# Patient Record
Sex: MALE | Race: WHITE | Employment: FULL TIME | URBAN - METROPOLITAN AREA
[De-identification: names, ages, dates, MRNs, and addresses within clinical notes are randomized per-mention and may not be internally consistent; named-entity substitution may affect disease eponyms.]

---

## 2022-02-10 ENCOUNTER — HOSPITAL ENCOUNTER (OUTPATIENT)
Dept: OCCUPATIONAL MEDICINE | Age: 32
Discharge: HOME | End: 2022-02-10

## 2022-02-10 ENCOUNTER — TRANSCRIBE ORDER (OUTPATIENT)
Dept: OCCUPATIONAL MEDICINE | Age: 32
End: 2022-02-10

## 2022-02-10 DIAGNOSIS — T14.90XA INJURY: Primary | ICD-10-CM

## 2022-02-10 DIAGNOSIS — T14.90XA INJURY: ICD-10-CM

## 2024-05-20 ENCOUNTER — OFFICE VISIT (OUTPATIENT)
Dept: UROLOGY | Age: 34
End: 2024-05-20
Payer: OTHER GOVERNMENT

## 2024-05-20 DIAGNOSIS — E34.9 ABNORMALITY OF HORMONE: ICD-10-CM

## 2024-05-20 DIAGNOSIS — N52.9 ERECTILE DYSFUNCTION, UNSPECIFIED ERECTILE DYSFUNCTION TYPE: Primary | ICD-10-CM

## 2024-05-20 DIAGNOSIS — R35.0 URINARY FREQUENCY: ICD-10-CM

## 2024-05-20 DIAGNOSIS — R39.15 URINARY URGENCY: ICD-10-CM

## 2024-05-20 LAB
BILIRUBIN, URINE, POC: NEGATIVE
BLOOD URINE, POC: NEGATIVE
ESTRADIOL SERPL-MCNC: 16.5 PG/ML
FSH SERPL-ACNC: 2.1 MIU/ML
GLUCOSE URINE, POC: NEGATIVE
KETONES, URINE, POC: NEGATIVE
LEUKOCYTE ESTERASE, URINE, POC: NEGATIVE
LH SERPL-ACNC: 7.8 MIU/ML
NITRITE, URINE, POC: NEGATIVE
PH, URINE, POC: 8 (ref 4.6–8)
PROLACTIN SERPL-MCNC: 10.2 NG/ML (ref 4–15.2)
PROTEIN,URINE, POC: NEGATIVE
SPECIFIC GRAVITY, URINE, POC: 1.01 (ref 1–1.03)
URINALYSIS CLARITY, POC: NORMAL
URINALYSIS COLOR, POC: NORMAL
UROBILINOGEN, POC: NORMAL

## 2024-05-20 PROCEDURE — 99204 OFFICE O/P NEW MOD 45 MIN: CPT | Performed by: NURSE PRACTITIONER

## 2024-05-20 PROCEDURE — 81003 URINALYSIS AUTO W/O SCOPE: CPT | Performed by: NURSE PRACTITIONER

## 2024-05-20 RX ORDER — TADALAFIL 5 MG/1
5 TABLET ORAL DAILY
Qty: 90 TABLET | Refills: 3 | Status: SHIPPED | OUTPATIENT
Start: 2024-05-20

## 2024-05-20 RX ORDER — PROPRANOLOL HYDROCHLORIDE 20 MG/1
20 TABLET ORAL
COMMUNITY
Start: 2024-01-25

## 2024-05-20 RX ORDER — PANTOPRAZOLE SODIUM 40 MG/1
40 TABLET, DELAYED RELEASE ORAL
COMMUNITY
Start: 2024-01-25

## 2024-05-20 RX ORDER — SOLIFENACIN SUCCINATE 5 MG/1
5 TABLET, FILM COATED ORAL DAILY
Qty: 90 TABLET | Refills: 3 | Status: SHIPPED | OUTPATIENT
Start: 2024-05-20

## 2024-05-20 ASSESSMENT — ENCOUNTER SYMPTOMS
WHEEZING: 0
BLOOD IN STOOL: 0
ABDOMINAL PAIN: 0
COUGH: 0
EYE DISCHARGE: 0
NAUSEA: 0
EYE PAIN: 0
SHORTNESS OF BREATH: 0
BACK PAIN: 0
HEARTBURN: 0
SKIN LESIONS: 0
CONSTIPATION: 0
DIARRHEA: 0
VOMITING: 0
INDIGESTION: 0

## 2024-05-20 NOTE — PROGRESS NOTES
[facility]  [address] [address]  [phone]          Pastora Dougherty  : 1990    Chief Complaint   Patient presents with    New Patient    Erectile Dysfunction          HPI     Pastora Dougherty is a 34 y.o. male referred for ED. He reports issue w ED and voiding issues started in . He was serving over [employer]. Was taking Mefloquine for malaria prevention. While on this he had 2 episodes of nocturnal enuresis. This resolved. Cont to have sign urinary frequency and urgency. His son has one kidney. [facility] of kidneys normal per pt report. Has NO urinary hesitancy or weak stream. No hematuria.     He does reports having abnormal free testosterone and estrogen levels. I do not have access to this. He wants to see endocrinology. [facility] pt but he needs new labs.    Labs:  Total T: () 622, (3/24) 573.74    No personal or family h/o urological CA. Smokeless tobacco user.     . Previous vasectomy.       History reviewed. No pertinent past medical history.  Past Surgical History:   Procedure Laterality Date    ORTHOPEDIC SURGERY      right wrist/ right foot     Current Outpatient Medications   Medication Sig Dispense Refill    pantoprazole (PROTONIX) 40 MG tablet 1 tablet      Phentermine-Topiramate 7.5-46 MG CP24 TAKE 1 CAPSULE BY MOUTH EVERY DAY FOR WEIGHT LOSS FOR 12 WEEKS. **START TAKING AFTER FINISHING PHENTERMINE 3.75/TOPIRAMATE  23 MG CAPSULES** FOR 12 WEEKS. **START TAKING AFTER FINISHING PHENTERMINE 3.75/TOPIRAMATE  23 MG CAPSULES**      propranolol (INDERAL) 20 MG tablet 1 tablet      solifenacin (VESICARE) 5 MG tablet Take 1 tablet by mouth daily 90 tablet 3    tadalafil (CIALIS) 5 MG tablet Take 1 tablet by mouth daily 90 tablet 3    albuterol sulfate  (90 Base) MCG/ACT inhaler Inhale 2 puffs into the lungs every 4 hours as needed      fluticasone (FLONASE) 50 MCG/ACT nasal spray 2 sprays by

## 2024-05-26 LAB
TESTOST FREE SERPL-MCNC: 9.1 PG/ML (ref 8.7–25.1)
TESTOST SERPL-MCNC: 544 NG/DL (ref 264–916)

## 2024-07-29 ENCOUNTER — OFFICE VISIT (OUTPATIENT)
Dept: UROLOGY | Age: 34
End: 2024-07-29
Payer: OTHER GOVERNMENT

## 2024-07-29 DIAGNOSIS — N52.9 ERECTILE DYSFUNCTION, UNSPECIFIED ERECTILE DYSFUNCTION TYPE: ICD-10-CM

## 2024-07-29 DIAGNOSIS — R39.15 URINARY URGENCY: Primary | ICD-10-CM

## 2024-07-29 DIAGNOSIS — R35.0 URINARY FREQUENCY: ICD-10-CM

## 2024-07-29 LAB
BILIRUBIN, URINE, POC: NEGATIVE
BLOOD URINE, POC: NEGATIVE
GLUCOSE URINE, POC: NEGATIVE
KETONES, URINE, POC: NEGATIVE
LEUKOCYTE ESTERASE, URINE, POC: NEGATIVE
NITRITE, URINE, POC: NEGATIVE
PH, URINE, POC: 7.5 (ref 4.6–8)
PROTEIN,URINE, POC: NEGATIVE
SPECIFIC GRAVITY, URINE, POC: 1.01 (ref 1–1.03)
URINALYSIS CLARITY, POC: NORMAL
URINALYSIS COLOR, POC: NORMAL
UROBILINOGEN, POC: NORMAL

## 2024-07-29 PROCEDURE — 81003 URINALYSIS AUTO W/O SCOPE: CPT | Performed by: NURSE PRACTITIONER

## 2024-07-29 PROCEDURE — 99214 OFFICE O/P EST MOD 30 MIN: CPT | Performed by: NURSE PRACTITIONER

## 2024-07-29 RX ORDER — OXYBUTYNIN CHLORIDE 5 MG/1
5 TABLET ORAL 2 TIMES DAILY
Qty: 180 TABLET | Refills: 0 | Status: SHIPPED | OUTPATIENT
Start: 2024-07-29

## 2024-07-29 ASSESSMENT — ENCOUNTER SYMPTOMS
BACK PAIN: 0
NAUSEA: 0

## 2024-07-29 NOTE — PROGRESS NOTES
[facility]  [address] [address]  [phone]          Pastora Dougherty  : 1990    Chief Complaint   Patient presents with    Follow-up          HPI     Pastora Dougherty is a 34 y.o. male referred for ED. He reports issue w ED and voiding issues started in . He was serving over [employer]. Was taking Mefloquine for malaria prevention. While on this he had 2 episodes of nocturnal enuresis. This resolved. Cont to have sign urinary frequency and urgency. His son has one kidney. [facility] Ultrasound of kidneys normal per pt report. Has NO urinary hesitancy or weak stream. No hematuria.     Tried vesicare. This helped but could not tolerate the dizziness.     On Cilias 5 mg PO daily for ED. This helps.      He does reports having abnormal free testosterone and estrogen levels      Total T: () 622, (3/24) 573.74  Prolactin/LH/FSH/Estradiol/Testerone (24) WNL     No personal or family h/o urological CA. Smokeless tobacco user.     . Previous vasectomy.         History reviewed. No pertinent past medical history.  Past Surgical History:   Procedure Laterality Date    ORTHOPEDIC SURGERY      right wrist/ right foot     Current Outpatient Medications   Medication Sig Dispense Refill    oxyBUTYnin (DITROPAN) 5 MG tablet Take 1 tablet by mouth in the morning and at bedtime 180 tablet 0    pantoprazole (PROTONIX) 40 MG tablet 1 tablet      Phentermine-Topiramate 7.5-46 MG CP24 TAKE 1 CAPSULE BY MOUTH EVERY DAY FOR WEIGHT LOSS FOR 12 WEEKS. **START TAKING AFTER FINISHING PHENTERMINE 3.75/TOPIRAMATE  23 MG CAPSULES** FOR 12 WEEKS. **START TAKING AFTER FINISHING PHENTERMINE 3.75/TOPIRAMATE  23 MG CAPSULES**      propranolol (INDERAL) 20 MG tablet 1 tablet      tadalafil (CIALIS) 5 MG tablet Take 1 tablet by mouth daily 90 tablet 3    fluticasone (FLONASE) 50 MCG/ACT nasal spray 2 sprays by Nasal route daily      amoxicillin-clavulanate (AUGMENTIN)

## 2024-09-05 ENCOUNTER — OFFICE VISIT (OUTPATIENT)
Dept: UROLOGY | Age: 34
End: 2024-09-05
Payer: OTHER GOVERNMENT

## 2024-09-05 DIAGNOSIS — N52.9 ERECTILE DYSFUNCTION, UNSPECIFIED ERECTILE DYSFUNCTION TYPE: ICD-10-CM

## 2024-09-05 DIAGNOSIS — R35.0 URINARY FREQUENCY: ICD-10-CM

## 2024-09-05 DIAGNOSIS — R39.15 URINARY URGENCY: Primary | ICD-10-CM

## 2024-09-05 PROCEDURE — 99214 OFFICE O/P EST MOD 30 MIN: CPT | Performed by: NURSE PRACTITIONER

## 2024-09-05 PROCEDURE — 81003 URINALYSIS AUTO W/O SCOPE: CPT | Performed by: NURSE PRACTITIONER

## 2024-09-05 RX ORDER — SILDENAFIL 100 MG/1
TABLET, FILM COATED ORAL
Qty: 30 TABLET | Refills: 3 | Status: SHIPPED | OUTPATIENT
Start: 2024-09-05

## 2024-09-05 RX ORDER — MIRABEGRON 25 MG/1
25 TABLET, FILM COATED, EXTENDED RELEASE ORAL DAILY
Qty: 90 TABLET | Refills: 1 | Status: SHIPPED | OUTPATIENT
Start: 2024-09-05

## 2024-09-05 ASSESSMENT — ENCOUNTER SYMPTOMS
BACK PAIN: 0
NAUSEA: 0

## 2024-09-05 NOTE — PROGRESS NOTES
[facility]  [address] [address]  [phone]          Pastora Dougherty  : 1990    Chief Complaint   Patient presents with    Follow-up          HPI     Pastora Dougherty is a 34 y.o. male initially referred for ED. He reports issue w ED and voiding issues started in . He was serving over [employer]. Was taking Mefloquine for malaria prevention. While on this he had 2 episodes of nocturnal enuresis. This resolved. Cont to have sign urinary frequency and urgency. His son has one kidney. [facility] Ultrasound of kidneys normal per pt report. Has NO urinary hesitancy or weak stream. No hematuria.      Tried vesicare. This helped but could not tolerate the dizziness. Now on oxybutynin. Having dry mouth. Felt like this also worsened ED.     On Cilias 5 mg PO daily for ED. This helps. Still has some issues.     He does reports having abnormal free testosterone and estrogen levels      Total T: () 622, (3/24) 573.74  Prolactin/LH/FSH/Estradiol/Testerone (24) WNL     No personal or family h/o urological CA. Smokeless tobacco user.     . Previous vasectomy.           History reviewed. No pertinent past medical history.  Past Surgical History:   Procedure Laterality Date    ORTHOPEDIC SURGERY      right wrist/ right foot     Current Outpatient Medications   Medication Sig Dispense Refill    mirabegron (MYRBETRIQ) 25 MG TB24 Take 1 tablet by mouth daily 90 tablet 1    sildenafil (VIAGRA) 100 MG tablet Take 1/2 tablet PO daily PRN ED 30 tablet 3    oxyBUTYnin (DITROPAN) 5 MG tablet Take 1 tablet by mouth in the morning and at bedtime 180 tablet 0    pantoprazole (PROTONIX) 40 MG tablet 1 tablet      Phentermine-Topiramate 7.5-46 MG CP24 TAKE 1 CAPSULE BY MOUTH EVERY DAY FOR WEIGHT LOSS FOR 12 WEEKS. **START TAKING AFTER FINISHING PHENTERMINE 3.75/TOPIRAMATE  23 MG CAPSULES** FOR 12 WEEKS. **START TAKING AFTER FINISHING PHENTERMINE 3.75/TOPIRAMATE  23

## 2024-12-12 ENCOUNTER — TELEPHONE (OUTPATIENT)
Dept: UROLOGY | Age: 34
End: 2024-12-12

## 2024-12-12 RX ORDER — MIRABEGRON 25 MG/1
25 TABLET, FILM COATED, EXTENDED RELEASE ORAL DAILY
Qty: 90 TABLET | Refills: 1 | Status: SHIPPED | OUTPATIENT
Start: 2024-12-12

## 2025-03-31 ENCOUNTER — OFFICE VISIT (OUTPATIENT)
Dept: ENT CLINIC | Age: 35
End: 2025-03-31
Payer: OTHER GOVERNMENT

## 2025-03-31 ENCOUNTER — PREP FOR PROCEDURE (OUTPATIENT)
Dept: ENT CLINIC | Age: 35
End: 2025-03-31

## 2025-03-31 VITALS
DIASTOLIC BLOOD PRESSURE: 74 MMHG | SYSTOLIC BLOOD PRESSURE: 122 MMHG | HEIGHT: 68 IN | WEIGHT: 194.8 LBS | BODY MASS INDEX: 29.52 KG/M2

## 2025-03-31 DIAGNOSIS — J35.01 CHRONIC TONSILLITIS: Primary | ICD-10-CM

## 2025-03-31 DIAGNOSIS — J35.01 CHRONIC TONSILLITIS: ICD-10-CM

## 2025-03-31 PROCEDURE — 99204 OFFICE O/P NEW MOD 45 MIN: CPT | Performed by: OTOLARYNGOLOGY

## 2025-03-31 ASSESSMENT — ENCOUNTER SYMPTOMS
ALLERGIC/IMMUNOLOGIC NEGATIVE: 1
EYES NEGATIVE: 1
SORE THROAT: 1
RESPIRATORY NEGATIVE: 1
GASTROINTESTINAL NEGATIVE: 1

## 2025-03-31 NOTE — PROGRESS NOTES
Chief Complaint   Patient presents with    New Patient     Inflamed tonsils   Tonsils stone        HPI:  Pastora Dougherty is a 35 y.o. male seen today in initial consultation for his throat.  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  He has been dealing with intermittent sore throats, almost every other month for the past couple of years.  He typically presents with sore throat dysphagia which can last for up to a week at a time.  His most recent infection was 1 month ago.  He has noted more frequent tonsil stones and even halitosis despite good oral hygiene.  He mainly dealt with sinus issues earlier in life, and denies any previous pharyngeal pathology.  His tonsils have remained swollen and enlarged over the past couple of years, and he is even noted worsened snoring.  [facility], but does not currently sleep with a CPAP machine.    Past Medical History, Past Surgical History, Family history, Social History, and Medications were all reviewed with the patient today and updated as necessary.     No Known Allergies  Patient Active Problem List   Diagnosis    Chronic tonsillitis     Current Outpatient Medications   Medication Sig    fluticasone (FLONASE) 50 MCG/ACT nasal spray 2 sprays by Nasal route daily    mirabegron (MYRBETRIQ) 25 MG TB24 Take 1 tablet by mouth daily (Patient not taking: Reported on 3/31/2025)    sildenafil (VIAGRA) 100 MG tablet Take 1/2 tablet PO daily PRN ED (Patient not taking: Reported on 3/31/2025)    oxyBUTYnin (DITROPAN) 5 MG tablet Take 1 tablet by mouth in the morning and at bedtime (Patient not taking: Reported on 3/31/2025)    pantoprazole (PROTONIX) 40 MG tablet 1 tablet (Patient not taking: Reported on 3/31/2025)    Phentermine-Topiramate 7.5-46 MG CP24 TAKE 1 CAPSULE BY MOUTH EVERY DAY FOR WEIGHT LOSS FOR 12 WEEKS. **START TAKING AFTER FINISHING PHENTERMINE 3.75/TOPIRAMATE  23 MG CAPSULES** FOR 12

## 2025-04-25 RX ORDER — LORATADINE 10 MG/1
10 TABLET ORAL DAILY
COMMUNITY
Start: 2025-03-27

## 2025-04-25 NOTE — PERIOP NOTE
Patient verified name and .  Order for consent NOT found in EHR at time of PAT visit. Unable to verify case posting against order; surgery verified by patient.    Type 1B surgery, phone assessment complete.  Orders not received.  Labs per surgeon: none ordered  Labs per anesthesia protocol: not indicated    Patient answered medical/surgical history questions at their best of ability. All prior to admission medications documented in EPIC.    Patient instructed to continue taking all prescription medications up to the day of surgery but to take only the following medications the day of surgery according to anesthesia guidelines with a small sip of water: loratadine, flonse, pantoprazole if needed.  Also, patient is requested to take 2 Tylenol in the morning and then again before bed on the day before surgery. Regular or extra strength may be used.       Patient informed that all vitamins and supplements should be held 7 days prior to surgery and NSAIDS 5 days prior to surgery. Prescription meds to hold: viagra hold 24 hours prior to surgery, Cialis and phentermine-topiramate hold 5 days prior to surgery    Patient instructed on the following:    > Arrive at [facility] Entrance, time of arrival to be called the day before by 1700  > No food after midnight, patient may drink clear liquids up until 2 hours prior to arrival. No gum, candy, mints.   > Responsible adult must drive patient to the hospital, stay during surgery, and patient will need supervision 24 hours after anesthesia  > Use non moisturizing soap in shower the night before surgery and on the morning of surgery  > All piercings must be removed prior to arrival.    > Leave all valuables (money and jewelry) at home but bring insurance card and ID on DOS.   > You may be required to pay a deductible or co-pay on the day of your procedure. You can pre-pay by calling [phone] if your surgery is at the [facility] or [phone] if your surgery is at the [facility]

## 2025-05-01 DIAGNOSIS — J35.01 CHRONIC TONSILLITIS: Primary | ICD-10-CM

## 2025-05-01 DIAGNOSIS — G89.18 POST-OPERATIVE PAIN: ICD-10-CM

## 2025-05-01 RX ORDER — ONDANSETRON 4 MG/1
8 TABLET, FILM COATED ORAL EVERY 8 HOURS PRN
Qty: 8 TABLET | Refills: 0 | Status: SHIPPED | OUTPATIENT
Start: 2025-05-01

## 2025-05-01 RX ORDER — HYDROCODONE BITARTRATE AND ACETAMINOPHEN 7.5; 325 MG/15ML; MG/15ML
15 SOLUTION ORAL EVERY 6 HOURS PRN
Qty: 300 ML | Refills: 0 | Status: SHIPPED | OUTPATIENT
Start: 2025-05-01 | End: 2025-05-06

## 2025-05-01 RX ORDER — METHYLPREDNISOLONE 4 MG/1
TABLET ORAL
Qty: 1 KIT | Refills: 0 | Status: SHIPPED | OUTPATIENT
Start: 2025-05-01

## 2025-05-06 ENCOUNTER — ANESTHESIA EVENT (OUTPATIENT)
Dept: SURGERY | Age: 35
End: 2025-05-06
Payer: OTHER GOVERNMENT

## 2025-05-06 NOTE — PERIOP NOTE
Preop department called to notify patient of arrival time for scheduled procedure. Instructions given to   - Arrive at [facility] Entrance [address].  - Nothing to eat after midnight unless otherwise indicated. No gum, mints, or ice chips. You may have clear liquids two hours prior to arrival to the hospital.   - Have a responsible adult to drive patient to the hospital, stay during surgery, and patient will need supervision 24 hours after anesthesia.   - Use antibacterial soap in shower the night before surgery and on the morning of surgery.       Was patient contacted: yes  Voicemail left:   Numbers contacted: [phone]   Arrival time: 0930

## 2025-05-07 ENCOUNTER — ANESTHESIA (OUTPATIENT)
Dept: SURGERY | Age: 35
End: 2025-05-07
Payer: OTHER GOVERNMENT

## 2025-05-07 ENCOUNTER — HOSPITAL ENCOUNTER (OUTPATIENT)
Age: 35
Setting detail: OUTPATIENT SURGERY
Discharge: HOME | End: 2025-05-07
Attending: OTOLARYNGOLOGY | Admitting: OTOLARYNGOLOGY
Payer: OTHER GOVERNMENT

## 2025-05-07 VITALS
RESPIRATION RATE: 17 BRPM | SYSTOLIC BLOOD PRESSURE: 119 MMHG | TEMPERATURE: 97.5 F | HEIGHT: 68 IN | WEIGHT: 180 LBS | OXYGEN SATURATION: 97 % | HEART RATE: 66 BPM | BODY MASS INDEX: 27.28 KG/M2 | DIASTOLIC BLOOD PRESSURE: 88 MMHG

## 2025-05-07 DIAGNOSIS — J35.01 CHRONIC TONSILLITIS: ICD-10-CM

## 2025-05-07 PROCEDURE — 3700000000 HC ANESTHESIA ATTENDED CARE: Performed by: OTOLARYNGOLOGY

## 2025-05-07 PROCEDURE — 2580000003 HC RX 258: Performed by: NURSE ANESTHETIST, CERTIFIED REGISTERED

## 2025-05-07 PROCEDURE — 2709999900 HC NON-CHARGEABLE SUPPLY: Performed by: OTOLARYNGOLOGY

## 2025-05-07 PROCEDURE — 6370000000 HC RX 637 (ALT 250 FOR IP): Performed by: STUDENT IN AN ORGANIZED HEALTH CARE EDUCATION/TRAINING PROGRAM

## 2025-05-07 PROCEDURE — 2500000003 HC RX 250 WO HCPCS: Performed by: OTOLARYNGOLOGY

## 2025-05-07 PROCEDURE — 6360000002 HC RX W HCPCS: Performed by: NURSE ANESTHETIST, CERTIFIED REGISTERED

## 2025-05-07 PROCEDURE — 2500000003 HC RX 250 WO HCPCS: Performed by: NURSE ANESTHETIST, CERTIFIED REGISTERED

## 2025-05-07 PROCEDURE — 88304 TISSUE EXAM BY PATHOLOGIST: CPT

## 2025-05-07 PROCEDURE — 7100000000 HC PACU RECOVERY - FIRST 15 MIN: Performed by: OTOLARYNGOLOGY

## 2025-05-07 PROCEDURE — 7100000001 HC PACU RECOVERY - ADDTL 15 MIN: Performed by: OTOLARYNGOLOGY

## 2025-05-07 PROCEDURE — 3600000002 HC SURGERY LEVEL 2 BASE: Performed by: OTOLARYNGOLOGY

## 2025-05-07 PROCEDURE — 2580000003 HC RX 258: Performed by: STUDENT IN AN ORGANIZED HEALTH CARE EDUCATION/TRAINING PROGRAM

## 2025-05-07 PROCEDURE — 7100000010 HC PHASE II RECOVERY - FIRST 15 MIN: Performed by: OTOLARYNGOLOGY

## 2025-05-07 PROCEDURE — 3700000001 HC ADD 15 MINUTES (ANESTHESIA): Performed by: OTOLARYNGOLOGY

## 2025-05-07 PROCEDURE — 3600000012 HC SURGERY LEVEL 2 ADDTL 15MIN: Performed by: OTOLARYNGOLOGY

## 2025-05-07 RX ORDER — LABETALOL HYDROCHLORIDE 5 MG/ML
10 INJECTION, SOLUTION INTRAVENOUS
Status: DISCONTINUED | OUTPATIENT
Start: 2025-05-07 | End: 2025-05-07 | Stop reason: HOSPADM

## 2025-05-07 RX ORDER — OXYCODONE HYDROCHLORIDE 5 MG/1
10 TABLET ORAL PRN
Status: COMPLETED | OUTPATIENT
Start: 2025-05-07 | End: 2025-05-07

## 2025-05-07 RX ORDER — IPRATROPIUM BROMIDE AND ALBUTEROL SULFATE 2.5; .5 MG/3ML; MG/3ML
1 SOLUTION RESPIRATORY (INHALATION)
Status: DISCONTINUED | OUTPATIENT
Start: 2025-05-07 | End: 2025-05-07 | Stop reason: HOSPADM

## 2025-05-07 RX ORDER — ACETAMINOPHEN 500 MG
1000 TABLET ORAL ONCE
Status: COMPLETED | OUTPATIENT
Start: 2025-05-07 | End: 2025-05-07

## 2025-05-07 RX ORDER — LIDOCAINE HYDROCHLORIDE 20 MG/ML
INJECTION, SOLUTION EPIDURAL; INFILTRATION; INTRACAUDAL; PERINEURAL
Status: DISCONTINUED | OUTPATIENT
Start: 2025-05-07 | End: 2025-05-07 | Stop reason: SDUPTHER

## 2025-05-07 RX ORDER — FENTANYL CITRATE 50 UG/ML
100 INJECTION, SOLUTION INTRAMUSCULAR; INTRAVENOUS
Status: DISCONTINUED | OUTPATIENT
Start: 2025-05-07 | End: 2025-05-07 | Stop reason: HOSPADM

## 2025-05-07 RX ORDER — BUPIVACAINE HYDROCHLORIDE AND EPINEPHRINE 5; 5 MG/ML; UG/ML
INJECTION, SOLUTION EPIDURAL; INTRACAUDAL; PERINEURAL PRN
Status: DISCONTINUED | OUTPATIENT
Start: 2025-05-07 | End: 2025-05-07 | Stop reason: ALTCHOICE

## 2025-05-07 RX ORDER — NALOXONE HYDROCHLORIDE 0.4 MG/ML
INJECTION, SOLUTION INTRAMUSCULAR; INTRAVENOUS; SUBCUTANEOUS PRN
Status: DISCONTINUED | OUTPATIENT
Start: 2025-05-07 | End: 2025-05-07 | Stop reason: HOSPADM

## 2025-05-07 RX ORDER — DEXAMETHASONE SODIUM PHOSPHATE 10 MG/ML
INJECTION, SOLUTION INTRA-ARTICULAR; INTRALESIONAL; INTRAMUSCULAR; INTRAVENOUS; SOFT TISSUE
Status: DISCONTINUED | OUTPATIENT
Start: 2025-05-07 | End: 2025-05-07 | Stop reason: SDUPTHER

## 2025-05-07 RX ORDER — ONDANSETRON 2 MG/ML
INJECTION INTRAMUSCULAR; INTRAVENOUS
Status: DISCONTINUED | OUTPATIENT
Start: 2025-05-07 | End: 2025-05-07 | Stop reason: SDUPTHER

## 2025-05-07 RX ORDER — DIPHENHYDRAMINE HYDROCHLORIDE 50 MG/ML
12.5 INJECTION, SOLUTION INTRAMUSCULAR; INTRAVENOUS
Status: DISCONTINUED | OUTPATIENT
Start: 2025-05-07 | End: 2025-05-07 | Stop reason: HOSPADM

## 2025-05-07 RX ORDER — PROPOFOL 10 MG/ML
INJECTION, EMULSION INTRAVENOUS
Status: DISCONTINUED | OUTPATIENT
Start: 2025-05-07 | End: 2025-05-07 | Stop reason: SDUPTHER

## 2025-05-07 RX ORDER — SODIUM CHLORIDE 0.9 % (FLUSH) 0.9 %
5-40 SYRINGE (ML) INJECTION EVERY 12 HOURS SCHEDULED
Status: DISCONTINUED | OUTPATIENT
Start: 2025-05-07 | End: 2025-05-07 | Stop reason: HOSPADM

## 2025-05-07 RX ORDER — OXYCODONE HYDROCHLORIDE 5 MG/1
5 TABLET ORAL PRN
Status: COMPLETED | OUTPATIENT
Start: 2025-05-07 | End: 2025-05-07

## 2025-05-07 RX ORDER — MIDAZOLAM HYDROCHLORIDE 2 MG/2ML
2 INJECTION, SOLUTION INTRAMUSCULAR; INTRAVENOUS
Status: DISCONTINUED | OUTPATIENT
Start: 2025-05-07 | End: 2025-05-07 | Stop reason: HOSPADM

## 2025-05-07 RX ORDER — ROCURONIUM BROMIDE 10 MG/ML
INJECTION, SOLUTION INTRAVENOUS
Status: DISCONTINUED | OUTPATIENT
Start: 2025-05-07 | End: 2025-05-07 | Stop reason: SDUPTHER

## 2025-05-07 RX ORDER — PROCHLORPERAZINE EDISYLATE 5 MG/ML
5 INJECTION INTRAMUSCULAR; INTRAVENOUS
Status: DISCONTINUED | OUTPATIENT
Start: 2025-05-07 | End: 2025-05-07 | Stop reason: HOSPADM

## 2025-05-07 RX ORDER — SODIUM CHLORIDE 0.9 % (FLUSH) 0.9 %
5-40 SYRINGE (ML) INJECTION PRN
Status: DISCONTINUED | OUTPATIENT
Start: 2025-05-07 | End: 2025-05-07 | Stop reason: HOSPADM

## 2025-05-07 RX ORDER — HYDRALAZINE HYDROCHLORIDE 20 MG/ML
10 INJECTION INTRAMUSCULAR; INTRAVENOUS
Status: DISCONTINUED | OUTPATIENT
Start: 2025-05-07 | End: 2025-05-07 | Stop reason: HOSPADM

## 2025-05-07 RX ORDER — LIDOCAINE HYDROCHLORIDE 10 MG/ML
1 INJECTION, SOLUTION INFILTRATION; PERINEURAL
Status: DISCONTINUED | OUTPATIENT
Start: 2025-05-07 | End: 2025-05-07 | Stop reason: HOSPADM

## 2025-05-07 RX ORDER — SODIUM CHLORIDE 9 MG/ML
INJECTION, SOLUTION INTRAVENOUS PRN
Status: DISCONTINUED | OUTPATIENT
Start: 2025-05-07 | End: 2025-05-07 | Stop reason: HOSPADM

## 2025-05-07 RX ORDER — SODIUM CHLORIDE, SODIUM LACTATE, POTASSIUM CHLORIDE, CALCIUM CHLORIDE 600; 310; 30; 20 MG/100ML; MG/100ML; MG/100ML; MG/100ML
INJECTION, SOLUTION INTRAVENOUS CONTINUOUS
Status: DISCONTINUED | OUTPATIENT
Start: 2025-05-07 | End: 2025-05-07 | Stop reason: HOSPADM

## 2025-05-07 RX ORDER — HALOPERIDOL 5 MG/ML
1 INJECTION INTRAMUSCULAR
Status: DISCONTINUED | OUTPATIENT
Start: 2025-05-07 | End: 2025-05-07 | Stop reason: HOSPADM

## 2025-05-07 RX ADMIN — SUGAMMADEX 200 MG: 100 INJECTION, SOLUTION INTRAVENOUS at 12:51

## 2025-05-07 RX ADMIN — PROPOFOL 200 MG: 10 INJECTION, EMULSION INTRAVENOUS at 12:26

## 2025-05-07 RX ADMIN — ROCURONIUM BROMIDE 40 MG: 10 INJECTION, SOLUTION INTRAVENOUS at 12:27

## 2025-05-07 RX ADMIN — ONDANSETRON 4 MG: 2 INJECTION INTRAMUSCULAR; INTRAVENOUS at 12:30

## 2025-05-07 RX ADMIN — ACETAMINOPHEN 1000 MG: 500 TABLET, FILM COATED ORAL at 09:18

## 2025-05-07 RX ADMIN — DEXAMETHASONE SODIUM PHOSPHATE 10 MG: 10 INJECTION INTRAMUSCULAR; INTRAVENOUS at 12:30

## 2025-05-07 RX ADMIN — LIDOCAINE HYDROCHLORIDE 100 MG: 20 INJECTION, SOLUTION EPIDURAL; INFILTRATION; INTRACAUDAL; PERINEURAL at 12:26

## 2025-05-07 RX ADMIN — PHENYLEPHRINE HYDROCHLORIDE 1 ML: 10 INJECTION INTRAVENOUS at 12:32

## 2025-05-07 RX ADMIN — SODIUM CHLORIDE, SODIUM LACTATE, POTASSIUM CHLORIDE, AND CALCIUM CHLORIDE: .6; .31; .03; .02 INJECTION, SOLUTION INTRAVENOUS at 09:22

## 2025-05-07 RX ADMIN — FENTANYL CITRATE 100 MCG: 50 INJECTION INTRAMUSCULAR; INTRAVENOUS at 12:26

## 2025-05-07 RX ADMIN — OXYCODONE HYDROCHLORIDE 10 MG: 5 TABLET ORAL at 13:16

## 2025-05-07 ASSESSMENT — PAIN DESCRIPTION - LOCATION: LOCATION: THROAT

## 2025-05-07 ASSESSMENT — PAIN SCALES - GENERAL
PAINLEVEL_OUTOF10: 0
PAINLEVEL_OUTOF10: 4
PAINLEVEL_OUTOF10: 7

## 2025-05-07 NOTE — H&P
35-year-old male seen recently for his throat.  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  He has been dealing with intermittent sore throats, almost every other month for the past couple of years.  He typically presents with sore throat dysphagia which can last for up to a week at a time.  His most recent infection was 1 month ago.  He has noted more frequent tonsil stones and even halitosis despite good oral hygiene.  He mainly dealt with sinus issues earlier in life, and denies any previous pharyngeal pathology.  His tonsils have remained swollen and enlarged over the past couple of years, and he is even noted worsened snoring.  [facility], but does not currently sleep with a CPAP machine.     Past Medical History:   Diagnosis Date    Anxiety     ED (erectile dysfunction)     GERD (gastroesophageal reflux disease)     OTC prn    PTSD (post-traumatic stress disorder)     Seasonal allergies     Sleep apnea     does not tolerate cpap     Past Surgical History:   Procedure Laterality Date    APPENDECTOMY      ORTHOPEDIC SURGERY      right wrist/ right foot     Social History     Socioeconomic History    Marital status:      Spouse name: Not on file    Number of children: Not on file    Years of education: Not on file    Highest education level: Not on file   Occupational History    Not on file   Tobacco Use    Smoking status: Never    Smokeless tobacco: Never    Tobacco comments:     Quit smoking: 3-4 cans per week   Vaping Use    Vaping status: Never Used   Substance and Sexual Activity    Alcohol use: No     Alcohol/week: 0.0 standard drinks of alcohol    Drug use: No    Sexual activity: Not on file   Other Topics Concern    Not on file   Social History Narrative    Not on file     Social Drivers of Health     Financial Resource Strain: Low Risk  (3/12/2025)    Received from [facility]    Financial Resource Strain

## 2025-05-07 NOTE — OP NOTE
Operative Note      Patient: Pastora Dougherty  YOB: 1990  MRN: [id number]    Date of Procedure: 5/7/2025    Pre-op diagnosis: Chronic tonsillitis    Postop diagnosis: Chronic tonsillitis    Procedure: Tonsillectomy    Operative Surgeon: Deejay Castelan MD    Anesthesia: General    Anesthesiologist: Jose Stephen MD    Operative findings: There were 3+ very cryptic and endophytic tonsils with multiple stones present bilaterally.  Tonsillectomy was performed.  There was no residual adenoid tissue.    IV fluid: 400 cc    Estimated Blood Loss (mL): 5 cc    Complications: None    Specimens:   ID Type Source Tests Collected by Time Destination   A : stitch on right side Tissue Tonsil SURGICAL PATHOLOGY Deejay Castelan MD 5/7/2025 1252        Implants: None      Drains: None     Disposition: PACU, then home    Condition: Stable    Brief history: Pastora is a 35-year-old male who was referred to my office for recurring throat infections.  On exam, he had 3+ very cryptic and endophytic tonsils bilaterally.  I diagnosed him with chronic tonsillitis, and the decision was made to take him to the operating room for a tonsillectomy.    Detailed Description of Procedure:   The patient was brought back to the operating room and placed on the table in a supine position.  General endotracheal anesthesia was inducted without complications.  Once the patient was adequately sedated, the head of the table was turned 90 degrees counterclockwise, and a shoulder roll was placed for improved neck extension.  He was then sterilely prepped and draped in the usual fashion.    I began by placing a Sheila Jose Miguel mouthgag into his oral cavity, and he was placed into suspension using the Moon stand.  Next, red rubber catheters were placed on both nasal cavities and used to suspend the soft palate.  Visualization of the oropharynx revealed 3+ very cryptic and endophytic tonsils bilaterally.  I used a dental mirror to visualize into the

## 2025-05-07 NOTE — ANESTHESIA PRE PROCEDURE
Negar ROCHA, APRN - CNP       Current medications:    Current Facility-Administered Medications   Medication Dose Route Frequency Provider Last Rate Last Admin   • lidocaine 1 % injection 1 mL  1 mL IntraDERmal Once PRN Jose Stephen MD       • fentaNYL (SUBLIMAZE) injection 100 mcg  100 mcg IntraVENous Once PRN Jose Stephen MD       • lactated ringers infusion   IntraVENous Continuous Jose Stephen  mL/hr at 05/07/25 0922 New Bag at 05/07/25 0922   • sodium chloride flush 0.9 % injection 5-40 mL  5-40 mL IntraVENous 2 times per day Jose Stephen MD       • sodium chloride flush 0.9 % injection 5-40 mL  5-40 mL IntraVENous PRN Jose Stephen MD       • 0.9 % sodium chloride infusion   IntraVENous PRN Jose Stephen MD       • midazolam PF (VERSED) injection 2 mg  2 mg IntraVENous Once PRN Jose Stephen MD           Allergies:  No Known Allergies    Problem List:    Patient Active Problem List   Diagnosis Code   • Chronic tonsillitis J35.01       Past Medical History:        Diagnosis Date   • Anxiety    • ED (erectile dysfunction)    • GERD (gastroesophageal reflux disease)     OTC prn   • PTSD (post-traumatic stress disorder)    • Seasonal allergies    • Sleep apnea     does not tolerate cpap       Past Surgical History:        Procedure Laterality Date   • APPENDECTOMY     • ORTHOPEDIC SURGERY      right wrist/ right foot       Social History:    Social History     Tobacco Use   • Smoking status: Never   • Smokeless tobacco: Never   • Tobacco comments:     Quit smoking: 3-4 cans per week   Substance Use Topics   • Alcohol use: No     Alcohol/week: 0.0 standard drinks of alcohol                                Counseling given: Not Answered  Tobacco comments: Quit smoking: 3-4 cans per week      Vital Signs (Current):   Vitals:    04/25/25 1057 05/07/25 0900   BP:  119/64   Pulse:  67   Resp:  18   Temp:  98 °F (36.7 °C)   TempSrc:  Oral   SpO2:  98%   Weight: 81.6 kg (180 lb) 81.6 kg (180 lb)   Height:

## 2025-05-07 NOTE — DISCHARGE INSTRUCTIONS
-Please start with cool liquids and advance to soft diet.  -Please call if there is any oral bleeding  -No strenuous activity or heavy lifting for 1 wk  -There will be some numbness and swelling of the tongue and uvula after surgery which is normal  -There may be some ear pain after surgery which is referred from the throat

## 2025-05-07 NOTE — ANESTHESIA POSTPROCEDURE EVALUATION
Department of Anesthesiology  Postprocedure Note    Patient: Pastora Dougherty  MRN: [id number]  YOB: 1990  Date of evaluation: 5/7/2025    Procedure Summary       Date: 05/07/25 Room / Location: [facility] OP OR 05 / [facility]    Anesthesia Start: 1217 Anesthesia Stop: 1307    Procedure: TONSILLECTOMY (Bilateral: Throat) Diagnosis:       Chronic tonsillitis      (Chronic tonsillitis [J35.01])    Surgeons: Deejay Castelan MD Responsible Provider: Jose Stephen MD    Anesthesia Type: general ASA Status: 2            Anesthesia Type: No value filed.    Titus Phase I: Titus Score: 10    Titus Phase II: Titus Score: 10    Anesthesia Post Evaluation    Patient location during evaluation: bedside  Patient participation: complete - patient participated  Level of consciousness: awake and alert  Airway patency: patent  Nausea & Vomiting: no vomiting  Cardiovascular status: hemodynamically stable  Respiratory status: acceptable  Hydration status: euvolemic  Comments: /88   Pulse 66   Temp 97.5 °F (36.4 °C)   Resp 17   Ht 1.727 m (5' 8\")   Wt 81.6 kg (180 lb)   SpO2 97%   BMI 27.37 kg/m²    Pain management: adequate    No notable events documented.

## 2025-05-16 ENCOUNTER — OFFICE VISIT (OUTPATIENT)
Dept: ENT CLINIC | Age: 35
End: 2025-05-16

## 2025-05-16 VITALS — RESPIRATION RATE: 10 BRPM | BODY MASS INDEX: 27.26 KG/M2 | HEIGHT: 68 IN | WEIGHT: 179.9 LBS

## 2025-05-16 DIAGNOSIS — Z90.89 S/P TONSILLECTOMY: Primary | ICD-10-CM

## 2025-05-16 PROCEDURE — 99024 POSTOP FOLLOW-UP VISIT: CPT | Performed by: OTOLARYNGOLOGY

## 2025-05-16 NOTE — PROGRESS NOTES
Pastora Dougherty is a 35 y.o. male seen today now 1 week post-op after undergoing tonsillectomy back on 5/7/25.  Doing great since then and the pain has really started to improve over the past couple days.  He has mainly complained of ear pain as opposed to severe throat pain.  He is tolerating soft foods including eggs.  There has been no oral bleeding at all.  He does not need a refill on pain medication.    Resp 10   Ht 1.727 m (5' 7.99\")   Wt 81.6 kg (179 lb 14.3 oz)   BMI 27.36 kg/m²    -NAD, well-appearing  -OC/OP- clear w/ well-healed tonsillar fossa bilaterally, no scabs/clots, moderate of uvula, no ecchymosis along gingiva, dentition intact  -Ears clear with no cerumen/debris, intact TMs, clear middle ear spaces bilaterally      Final Pathologic Diagnosis   A: Tonsils, tonsillectomy:   - Reactive follicular lymphoid hyperplasia.     A/P:   Diagnosis Orders   1. S/P tonsillectomy          Doing great now over 1 week out from his tonsillectomy.  His pathology returned as benign reactive follicular lymphoid hyperplasia as expected.  He may advance his diet and can return to normal activities by the end of the weekend.  RTC as needed.    Deejay Castelan MD

## (undated) DEVICE — GARMENT,MEDLINE,DVT,INT,CALF,MED, GEN2: Brand: MEDLINE

## (undated) DEVICE — GLOVE ORANGE PI 7 1/2   MSG9075

## (undated) DEVICE — SPONGE: SPECIALTY TONSIL XR MED 100/CS: Brand: MEDICAL ACTION INDUSTRIES

## (undated) DEVICE — Device

## (undated) DEVICE — DUAL LUMEN STOMACH TUBE: Brand: SALEM SUMP

## (undated) DEVICE — ELECTRODE PT RET AD L9FT HI MOIST COND ADH HYDRGEL CORDED

## (undated) DEVICE — SOLUTION IRRIG 1000ML 09% SOD CHL USP PIC PLAS CONTAINER

## (undated) DEVICE — SYRINGE MED 10ML LUERLOCK TIP W/O SFTY DISP

## (undated) DEVICE — STANDARD HYPODERMIC NEEDLE,POLYPROPYLENE HUB: Brand: MONOJECT